# Patient Record
Sex: MALE | Race: OTHER | HISPANIC OR LATINO | Employment: FULL TIME | URBAN - METROPOLITAN AREA
[De-identification: names, ages, dates, MRNs, and addresses within clinical notes are randomized per-mention and may not be internally consistent; named-entity substitution may affect disease eponyms.]

---

## 2018-02-05 ENCOUNTER — HOSPITAL ENCOUNTER (EMERGENCY)
Facility: HOSPITAL | Age: 28
Discharge: HOME | End: 2018-02-05
Attending: EMERGENCY MEDICINE | Admitting: EMERGENCY MEDICINE
Payer: COMMERCIAL

## 2018-02-05 VITALS
OXYGEN SATURATION: 100 % | DIASTOLIC BLOOD PRESSURE: 68 MMHG | RESPIRATION RATE: 20 BRPM | HEART RATE: 103 BPM | WEIGHT: 218.9 LBS | SYSTOLIC BLOOD PRESSURE: 159 MMHG | TEMPERATURE: 98.5 F

## 2018-02-05 DIAGNOSIS — K08.89 PAIN, DENTAL: Primary | ICD-10-CM

## 2018-02-05 PROCEDURE — 99282 EMERGENCY DEPT VISIT SF MDM: CPT

## 2018-02-05 RX ORDER — CHLORHEXIDINE GLUCONATE 0.12 MG/ML
15 RINSE ORAL 2 TIMES DAILY
Qty: 120 ML | Refills: 0 | Status: SHIPPED | OUTPATIENT
Start: 2018-02-05

## 2018-02-05 RX ORDER — BUPIVACAINE HYDROCHLORIDE AND EPINEPHRINE 5; 5 MG/ML; UG/ML
1.8 INJECTION, SOLUTION EPIDURAL; INTRACAUDAL; PERINEURAL ONCE
Status: COMPLETED | OUTPATIENT
Start: 2018-02-05 | End: 2018-02-05

## 2018-02-05 RX ADMIN — BUPIVACAINE HYDROCHLORIDE AND EPINEPHRINE BITARTRATE 1.8 ML: 5; .005 INJECTION, SOLUTION SUBCUTANEOUS at 22:05

## 2018-02-06 NOTE — ED ATTENDING ATTESTATION
Deanna Hair DO, saw and evaluated the patient  I have discussed the patient with the resident/non-physician practitioner and agree with the resident's/non-physician practitioner's findings, Plan of Care, and MDM as documented in the resident's/non-physician practitioner's note, except where noted  All available labs and Radiology studies were reviewed  At this point I agree with the current assessment done in the Emergency Department  I have conducted an independent evaluation of this patient a history and physical is as follows:    45-year-old male presents for evaluation of severe right-sided dental and facial pain that has been ongoing and escalating for the past couple days  The patient has been taking naproxen without relief  He states that his pain is worse with movement of his face and palpation of his right upper teeth  The patient denies any associated rash or change in hearing  The patient states that he had a fever of 101 the other day  Is afebrile at this time  A 10 point review of systems was performed  Was positive for dental pain, fever, headache  It was negative for rash, trouble swallowing or trouble breathing  The remainder of a 10 point review of systems was otherwise unremarkable  On examination:  The patient is awake, alert and oriented  HEENT: Normocephalic/atraumatic  External examination of the ears is unremarkable  Pupils are equal round and reactive to light, there is no conjunctival injection or scleral icterus noted  Nares are patent without rhinorrhea  The oropharynx is moist without injection  There is tenderness upon palpation to the right upper molars  Has tenderness to the buccal mucosa and outer cheek  There is no fluctuance appreciated  The neck is supple  Lungs: Clear to auscultation bilaterally  Heart: Regular without murmurs rubs or gallops  Abdomen: Soft and nontender  There are positive bowel sounds   there is no rebound or guarding  Musculoskeletal: Normal range of motion with grossly normal strength  Neuro: Cranial nerves II through XII grossly intact  Nonfocal exam  Skin: No rash noted  Psych: Mood and affect normal      The diagnosis is dental pain  The plan is for a dental block, discharge with outpatient dental follow-up  Continued NSAIDs and Tylenol for pain control  Peridex will also be prescribed  The patient (and any family present) verbalized understanding of the discharge instructions and warnings that would necessitate return to the Emergency Department  All questions were answered prior to discharge  Critical Care Time  CritCare Time    Procedures  I was present for the entire procedure of a infraorbital block was performed by the resident  Please see his note for details of the procedure

## 2018-02-06 NOTE — ED PROVIDER NOTES
History  Chief Complaint   Patient presents with    Dental Pain     Right upper sided dental pain since 2 days ago  Pt took ibuprofen 3hrs ago  HPI  51-year-old male with history of right-sided dental pain  Patient states that 2 days ago he started to have pain in his right upper molar just adjacent to his right upper wisdom tooth  It has progressively gotten worse over last couple days  Patient has been taking Motrin Tylenol states initially that was getting better but today it did not help  Patient states that he felt like his face was swollen, this coworkers said it was swollen  Patient went home and came here because he is having pain  Patient otherwise denies fevers chills sweats  Patient does not have a dentist that he sees regularly  He is still able to open his jaw, eat and drink, he has no trouble swallowing, he has no neck pain headache or vision changes  Denies chest pain shortness of breath abdominal pain nausea vomiting or diarrhea  None       History reviewed  No pertinent past medical history  History reviewed  No pertinent surgical history  History reviewed  No pertinent family history  I have reviewed and agree with the history as documented  Social History   Substance Use Topics    Smoking status: Current Some Day Smoker    Smokeless tobacco: Not on file    Alcohol use No        Review of Systems   Constitutional: Negative  HENT: Positive for dental problem  Negative for drooling and facial swelling  Eyes: Negative  Respiratory: Negative  Cardiovascular: Negative  Gastrointestinal: Negative  Endocrine: Negative  Genitourinary: Negative  Musculoskeletal: Negative  Skin: Negative  Allergic/Immunologic: Negative  Neurological: Negative  Hematological: Negative  Psychiatric/Behavioral: Negative          Physical Exam  ED Triage Vitals [02/05/18 1910]   Temperature Pulse Respirations Blood Pressure SpO2   98 5 °F (36 9 °C) 103 20 159/68 100 %      Temp Source Heart Rate Source Patient Position - Orthostatic VS BP Location FiO2 (%)   Temporal Monitor -- Right arm --      Pain Score       --           Orthostatic Vital Signs  Vitals:    02/05/18 1910   BP: 159/68   Pulse: 103       Physical Exam   Constitutional: He is oriented to person, place, and time  He appears well-developed and well-nourished  No distress  HENT:   Head: Normocephalic and atraumatic  Right Ear: External ear normal    Left Ear: External ear normal    Mouth/Throat: Oropharynx is clear and moist  No oropharyngeal exudate  Patient has no trismus on exam, his no obvious dental caries, he has no fluctuance or obvious abscess of the buccal mucosa on the upper lower jaw  Eyes: Conjunctivae and EOM are normal  Pupils are equal, round, and reactive to light  Right eye exhibits no discharge  Left eye exhibits no discharge  No scleral icterus  Neck: Normal range of motion  Neck supple  No tracheal deviation present  No thyromegaly present  Cardiovascular: Normal rate, regular rhythm and intact distal pulses  Exam reveals no gallop and no friction rub  No murmur heard  Pulmonary/Chest: Effort normal and breath sounds normal  No stridor  No respiratory distress  He has no wheezes  He has no rales  Abdominal: Soft  Bowel sounds are normal  He exhibits no distension  There is no tenderness  There is no rebound and no guarding  Musculoskeletal: Normal range of motion  He exhibits no edema or deformity  Neurological: He is alert and oriented to person, place, and time  No cranial nerve deficit  Skin: Skin is warm and dry  No rash noted  He is not diaphoretic  No erythema  Psychiatric: He has a normal mood and affect  His behavior is normal  Thought content normal    Nursing note and vitals reviewed        ED Medications  Medications   bupivacaine-epinephrine (PF) (MARCAINE/EPINEPHRINE PF) 0 5 %-1:[id number] injection 1 8 mL (1 8 mL Infiltration Given by Other 2/5/18 5866) Diagnostic Studies  Results Reviewed     None                 No orders to display         Procedures  Nerve Block  Date/Time: 2/5/2018 10:39 PM  Performed by: Ariana Alejandre  Authorized by: Susanne Cisneros     Patient location:  ED  Other Assisting Provider: Yes (comment)    Consent:     Consent obtained:  Verbal    Consent given by:  Patient  Universal protocol:     Patient identity confirmed:  Verbally with patient  Indications:     Indications:  Pain relief  Location:     Body area:  Head    Head nerve blocked: The infraorbital on the right  Nerve type:  Peripheral    Laterality:  Right  Skin anesthesia (see MAR for exact dosages):     Skin anesthesia method:  Local infiltration    Local anesthetic:  Bupivacaine 0 5% WITH epi  Procedure details (see MAR for exact dosages): Block needle gauge:  25 G    Anesthetic injected:  Bupivacaine 0 5% WITH epi    Steroid injected:  None    Additive injected:  None    Injection procedure:  Anatomic landmarks identified  Post-procedure details:     Dressing:  None    Outcome:  Anesthesia achieved    Patient tolerance of procedure: Tolerated well, no immediate complications          Phone Consults  ED Phone Contact    ED Course  ED Course       patient feels much improved after the dental block  Return precautions were discussed with the patient verbalized understanding patient was discharged with follow-up instructions for the dental clinic  MDM  Number of Diagnoses or Management Options  Pain, dental:   Diagnosis management comments: 26-year-old male presents with dental pain  There is no obvious carrier obvious infection  Will defer antibiotics or abscess drainage at this time  Will do an infraorbital block, did write for prescription for Peridex, and have patient follow up with the dental clinic      CritCare Time    Disposition  Final diagnoses:   Pain, dental     Time reflects when diagnosis was documented in both MDM as applicable and the Disposition within this note     Time User Action Codes Description Comment    2/5/2018  9:52 PM Dionicio Fofana Add [K08 89] Pain, dental       ED Disposition     ED Disposition Condition Comment    Discharge  [facility] discharge to home/self care  Condition at discharge: Stable        Follow-up Information     Follow up With Specialties Details Why Ramsey  Call in 1 day To follow up being seen in the emergency department  [address] [place] [address]  [phone]        Discharge Medication List as of 2/5/2018 10:09 PM      START taking these medications    Details   chlorhexidine (PERIDEX) 0 12 % solution Apply 15 mL to the mouth or throat 2 (two) times a day, Starting Mon 2/5/2018, Print           No discharge procedures on file  ED Provider  Attending physically available and evaluated [facility]  I managed the patient along with the ED Attending      Electronically Signed by         Mukund Mckeon MD  02/13/18 9670

## 2018-02-06 NOTE — DISCHARGE INSTRUCTIONS
Dolor de [employer]   LO QUE NECESITA SABER:   Un dolor de SMITH'S GREEN que es causado por shiva inflamación del nervio en el centro del diente  La irritación puede ser causada por varios problemas, andrews por caries, shiva infección, un diente vencido o enfermedad de las encías  Es muy importante que acuda a shiva gertrudis de control con john odontólogo para que le puedan diagnosticar la causa de john dolor de SMITH'S GREEN y recibir tratamiento  Lo cual puede ayudar a prevenir problemas serios  Sae Listen [facility]:   Medicamentos:  Es posible que usted necesite alguno de los siguientes:  · AINEs (Analgésicos antiinflamatorios no esteroides)  disminuyen la inflamación y el dolor  Baron medicamento se puede comprar con o sin receta médica  Baron medicamento puede causar sangrado estomacal o problemas en los riñones en ciertas personas  Si usted todd un medicamento anticoagulante, asegúrese de preguntarle a john médico si los ARPAN son seguros para usted  Letitia siempre la etiqueta y siga cuidadosamente las instrucciones antes de usar baron medicamento  · El acetaminofén  [employer]  Está disponible sin receta médica  Pregunte la cantidad y la frecuencia con que debe tomarlos  [address]  El acetaminofén puede causar daño en el [employer] cuando no se todd de forma correcta  · Los analgésicos  john forma de presentación puede ser en píldora o andrews un medicamento que se aplica directamente en el diente o las encías  No espere hasta que el dolor sea severo antes de araceli baron medicamento  · Antibióticos  contribuyen a combatir o evitar que el dalton contraiga shiva infección causada por shiva bacteria  Tómelos tino [employer]  · [place] juan medicamentos andrews se le haya indicado  Consulte con john médico si usted jerod que john medicamento no le está ayudando o si presenta efectos secundarios  Infórmele si es alérgico a algún medicamento   Mantenga shiva lista actualizada de los [address] [place] [address], las vitaminas y los productos herbales que todd  Incluya los siguientes datos de los medicamentos: cantidad, frecuencia y motivo de administración  Traiga con usted la lista o los envases de la píldoras a juan citas de seguimiento  Lleve la lista de los medicamentos con usted en mickey de shiva emergencia  Programe shiva gertrudis de seguimiento con bee dentista tino andrews se le indica:  Es posible que lo refieran a un odontólogo cirujano  Anote juan preguntas para que se acuerde de hacerlas roberto juan visitas  Cuidados personales:   · Enjuague la boca con agua tibia con sal 4 veces al día o según las indicaciones  · Es posible que necesite comer alimentos blandos para aliviar el dolor que le causa masticar  Comuníquese con bee dentista si:   · Usted tiene preguntas o inquietudes acerca de bee condición o cuidado  Regrese a la chacho de emergencias si:   · Usted tiene dificultad para respirar  · Usted tiene bee marilynn o jayla inflamados  · Usted tiene fiebre o escalofríos  · Usted tiene dificultad para hablar o tragar  · Usted tiene dificultad para abrir o cerrar la boca  © 2017 [address] Information is for End User's use only and may not be sold, redistributed or otherwise used for commercial purposes  All illustrations and images included in CareNotes® are the copyrighted property of A D A M , Inc  or Brooks Ibarra  Esta información es sólo para uso en educación  Bee intención no es darle un consejo médico sobre enfermedades o tratamientos  Colsulte con bee Charna Heckler farmacéutico antes de seguir cualquier régimen médico para saber si es seguro y efectivo para usted

## 2022-03-02 ENCOUNTER — HOSPITAL ENCOUNTER (EMERGENCY)
Facility: HOSPITAL | Age: 32
Discharge: HOME | End: 2022-03-02
Attending: EMERGENCY MEDICINE

## 2022-03-02 VITALS
SYSTOLIC BLOOD PRESSURE: 123 MMHG | RESPIRATION RATE: 18 BRPM | DIASTOLIC BLOOD PRESSURE: 71 MMHG | OXYGEN SATURATION: 99 % | HEART RATE: 102 BPM | WEIGHT: 235.2 LBS | TEMPERATURE: 98 F

## 2022-03-02 DIAGNOSIS — T14.8XXA ABRASION: ICD-10-CM
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
## 2022-03-02 DIAGNOSIS — S16.1XXA STRAIN OF NECK MUSCLE, INITIAL ENCOUNTER: ICD-10-CM

## 2022-03-02 PROCEDURE — 99284 EMERGENCY DEPT VISIT MOD MDM: CPT

## 2022-03-02 PROCEDURE — 99282 EMERGENCY DEPT VISIT SF MDM: CPT | Performed by: EMERGENCY MEDICINE

## 2022-03-02 NOTE — Clinical Note
Freddie Peacockshanthi was seen and treated in our emergency department on 3/2/2022  Diagnosis:     Selena Goins  may return to work on return date  He may return on this date: 03/04/2022         If you have any questions or concerns, please don't hesitate to call        Ioana Le MD    ______________________________           _______________          _______________  Hospital Representative                              Date                                Time
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
## 2024-05-23 ENCOUNTER — OFFICE VISIT (OUTPATIENT)
Dept: URGENT CARE | Age: 34
End: 2024-05-23
Payer: COMMERCIAL

## 2024-05-23 VITALS
TEMPERATURE: 97.3 F | DIASTOLIC BLOOD PRESSURE: 75 MMHG | SYSTOLIC BLOOD PRESSURE: 117 MMHG | OXYGEN SATURATION: 97 % | HEART RATE: 89 BPM | RESPIRATION RATE: 18 BRPM

## 2024-05-23 DIAGNOSIS — R55 SYNCOPE, UNSPECIFIED SYNCOPE TYPE: ICD-10-CM

## 2024-05-23 DIAGNOSIS — R07.9 CHEST PAIN, UNSPECIFIED TYPE: Primary | ICD-10-CM

## 2024-05-23 PROBLEM — R07.89 OTHER CHEST PAIN: Status: RESOLVED | Noted: 2024-05-23 | Resolved: 2024-05-23

## 2024-05-23 PROBLEM — R07.89 OTHER CHEST PAIN: Status: ACTIVE | Noted: 2024-05-23

## 2024-05-23 LAB
ATRIAL RATE: 90 BPM
P AXIS: 61 DEGREES
PR INTERVAL: 168 MS
QRS AXIS: 89 DEGREES
QRSD INTERVAL: 90 MS
QT INTERVAL: 362 MS
QTC INTERVAL: 442 MS
T WAVE AXIS: 40 DEGREES
VENTRICULAR RATE: 90 BPM

## 2024-05-23 PROCEDURE — 93005 ELECTROCARDIOGRAM TRACING: CPT | Performed by: STUDENT IN AN ORGANIZED HEALTH CARE EDUCATION/TRAINING PROGRAM

## 2024-05-23 PROCEDURE — 93010 ELECTROCARDIOGRAM REPORT: CPT | Performed by: INTERNAL MEDICINE

## 2024-05-23 PROCEDURE — G0384 LEV 5 HOSP TYPE B ED VISIT: HCPCS | Performed by: STUDENT IN AN ORGANIZED HEALTH CARE EDUCATION/TRAINING PROGRAM

## 2024-05-23 NOTE — PROGRESS NOTES
"  [facility]        NAME: Perry Rosenthal is a 34 y.o. male  : 1990    MRN: [id number]  DATE: May 23, 2024  TIME: 1:13 PM    Assessment and Plan   Chest pain, unspecified type [R07.9]  1. Chest pain, unspecified type        2. Syncope, unspecified syncope type        EKG NSR, no acute ischemic changes.  With his concerning symptoms, discussed limitations of evaluation at urgent care, he is agreeable to EMS transport to ER now.        Patient Instructions   Proceed to ER now.    Chief Complaint     Chief Complaint   Patient presents with    Chest Pain    Dizziness     Patient states that at work this morning he developed dizziness and started seeing lights. He then developed severe left arm pain and went to his car. He states taht he was there for about two hours and lost consciousness. He notes SOB and chest pain.          History of Present Illness       Patient presents for sternal chest pressure, paresthesias into left arm, dizziness, floaters within his vision, loss of consciousness.  Reports that he got very little sleep last night, was not feeling well at work, started with the symptoms, went out to his car and reportedly lost consciousness.  Was found by his coworkers about 2 hours later.  He then came to urgent care.  He continues with a squeezing sensation in his chest, dizziness, tingling into his left arm.  Denies previous PMHx other than notes that he had similar symptoms previously and was told he had a \"preheart attack\".  He endorses mild shortness of breath.        Review of Systems   Review of Systems   All other systems reviewed and are negative.        Current Medications       Current Outpatient Medications:     chlorhexidine (PERIDEX) 0.12 % solution, Apply 15 mL to the mouth or throat 2 (two) times a day, Disp: 120 mL, Rfl: 0    Current Allergies     Allergies as of 2024    (No Known Allergies)            The following portions of the patient's history were reviewed and " updated as appropriate: allergies, current medications, past family history, past medical history, past social history, past surgical history and problem list.     No past medical history on file.    No past surgical history on file.    No family history on file.      Medications have been verified.        Objective   /75   Pulse 89   Temp (!) 97.3 °F (36.3 °C)   Resp 18   SpO2 97%   No LMP for male patient.       Physical Exam     Physical Exam  Vitals and nursing note reviewed.   Constitutional:       Appearance: Normal appearance.      Comments: Lying on exam table with eyes closed, responding appropriately to questions, states that lying down feels better   HENT:      Head: Normocephalic and atraumatic.      Right Ear: External ear normal.      Left Ear: External ear normal.      Nose: Nose normal.      Mouth/Throat:      Mouth: Mucous membranes are moist.   Eyes:      Extraocular Movements: Extraocular movements intact.   Cardiovascular:      Rate and Rhythm: Normal rate and regular rhythm.      Heart sounds: Normal heart sounds.   Pulmonary:      Effort: Pulmonary effort is normal. No respiratory distress.      Breath sounds: Normal breath sounds. No stridor. No wheezing, rhonchi or rales.   Skin:     General: Skin is warm and dry.      Capillary Refill: Capillary refill takes less than 2 seconds.      Findings: No rash.   Neurological:      Mental Status: He is alert.